# Patient Record
Sex: MALE | Race: WHITE | Employment: STUDENT | URBAN - METROPOLITAN AREA
[De-identification: names, ages, dates, MRNs, and addresses within clinical notes are randomized per-mention and may not be internally consistent; named-entity substitution may affect disease eponyms.]

---
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
## 2020-11-02 ENCOUNTER — PREP FOR PROCEDURE (OUTPATIENT)
Dept: SURGERY | Facility: CLINIC | Age: 20
End: 2020-11-02

## 2020-11-02 ENCOUNTER — OFFICE VISIT (OUTPATIENT)
Dept: SURGERY | Facility: CLINIC | Age: 20
End: 2020-11-02
Payer: COMMERCIAL

## 2020-11-02 VITALS
HEIGHT: 71 IN | WEIGHT: 195 LBS | SYSTOLIC BLOOD PRESSURE: 120 MMHG | BODY MASS INDEX: 27.3 KG/M2 | DIASTOLIC BLOOD PRESSURE: 60 MMHG | HEART RATE: 56 BPM

## 2020-11-02 DIAGNOSIS — L05.91 PILONIDAL CYST: Primary | ICD-10-CM

## 2020-11-02 PROCEDURE — 99213 OFFICE O/P EST LOW 20 MIN: CPT | Performed by: SURGERY

## 2020-11-02 ASSESSMENT — MIFFLIN-ST. JEOR: SCORE: 1916.64

## 2020-11-03 NOTE — PROGRESS NOTES
General surgery clinic note    Since last visit Mr. Colón experienced the time of improvement and no drainage.  Unfortunately the symptoms returned and seem worse at the moment, he is here to discuss surgical treatment..    Exam there are multiple large pits along the midline between his gluteus, debris and hair can be easily extracted from it at the moment.    Worsening pilonidal cyst.  Discussed the rationale, risk, benefits, and hopeful outcomes of excision of this pilonidal cyst with marsupialization.  He understands and would like to proceed.  We will do so as soon as he can find time in his schedule.    Total encounter time 15 minutes, more than half spent in counseling.

## 2020-11-04 ENCOUNTER — TELEPHONE (OUTPATIENT)
Dept: SURGERY | Facility: CLINIC | Age: 20
End: 2020-11-04

## 2020-11-04 NOTE — TELEPHONE ENCOUNTER
Type of surgery: Excision of pilonidal cyst  Location of surgery: [facility]  Date and time of surgery: 1/14/21 at 12:30pm  Surgeon: Dr. Carrillo Madrigal  Pre-Op Appt Date: Patient to schedule  Post-Op Appt Date: Patient to schedule   Packet sent out: Yes  Pre-cert/Authorization completed:  Not Applicable  Date: 11/4/20

## 2020-11-13 DIAGNOSIS — Z11.59 ENCOUNTER FOR SCREENING FOR OTHER VIRAL DISEASES: Primary | ICD-10-CM

## 2021-01-11 DIAGNOSIS — Z11.59 ENCOUNTER FOR SCREENING FOR OTHER VIRAL DISEASES: ICD-10-CM

## 2021-01-11 PROCEDURE — U0003 INFECTIOUS AGENT DETECTION BY NUCLEIC ACID (DNA OR RNA); SEVERE ACUTE RESPIRATORY SYNDROME CORONAVIRUS 2 (SARS-COV-2) (CORONAVIRUS DISEASE [COVID-19]), AMPLIFIED PROBE TECHNIQUE, MAKING USE OF HIGH THROUGHPUT TECHNOLOGIES AS DESCRIBED BY CMS-2020-01-R: HCPCS | Performed by: SURGERY

## 2021-01-11 PROCEDURE — U0005 INFEC AGEN DETEC AMPLI PROBE: HCPCS | Performed by: SURGERY

## 2021-01-12 LAB
SARS-COV-2 RNA RESP QL NAA+PROBE: NOT DETECTED
SPECIMEN SOURCE: NORMAL

## 2021-01-14 ENCOUNTER — APPOINTMENT (OUTPATIENT)
Dept: SURGERY | Facility: PHYSICIAN GROUP | Age: 21
End: 2021-01-14
Payer: COMMERCIAL

## 2021-01-14 ENCOUNTER — SURGERY (OUTPATIENT)
Age: 21
End: 2021-01-14
Payer: COMMERCIAL

## 2021-01-14 ENCOUNTER — ANESTHESIA EVENT (OUTPATIENT)
Dept: SURGERY | Facility: CLINIC | Age: 21
End: 2021-01-14
Payer: COMMERCIAL

## 2021-01-14 ENCOUNTER — ANESTHESIA (OUTPATIENT)
Dept: SURGERY | Facility: CLINIC | Age: 21
End: 2021-01-14
Payer: COMMERCIAL

## 2021-01-14 ENCOUNTER — HOSPITAL ENCOUNTER (OUTPATIENT)
Facility: CLINIC | Age: 21
Discharge: HOME | End: 2021-01-14
Attending: SURGERY | Admitting: SURGERY
Payer: COMMERCIAL

## 2021-01-14 VITALS
OXYGEN SATURATION: 99 % | WEIGHT: 205 LBS | RESPIRATION RATE: 16 BRPM | HEIGHT: 71 IN | HEART RATE: 55 BPM | SYSTOLIC BLOOD PRESSURE: 110 MMHG | TEMPERATURE: 97 F | BODY MASS INDEX: 28.7 KG/M2 | DIASTOLIC BLOOD PRESSURE: 65 MMHG

## 2021-01-14 DIAGNOSIS — L05.91 PILONIDAL CYST: ICD-10-CM

## 2021-01-14 DIAGNOSIS — G89.18 POSTOPERATIVE PAIN: Primary | ICD-10-CM

## 2021-01-14 PROCEDURE — 88304 TISSUE EXAM BY PATHOLOGIST: CPT | Mod: TC | Performed by: SURGERY

## 2021-01-14 PROCEDURE — 11771 EXC PILONIDAL CYST XTNSV: CPT | Performed by: SURGERY

## 2021-01-14 PROCEDURE — 272N000001 HC OR GENERAL SUPPLY STERILE: Performed by: SURGERY

## 2021-01-14 PROCEDURE — 370N000017 HC ANESTHESIA TECHNICAL FEE, PER MIN: Performed by: SURGERY

## 2021-01-14 PROCEDURE — 360N000075 HC SURGERY LEVEL 2, PER MIN: Performed by: SURGERY

## 2021-01-14 PROCEDURE — 258N000003 HC RX IP 258 OP 636

## 2021-01-14 PROCEDURE — 11772 EXC PILONIDAL CYST COMP: CPT | Performed by: SURGERY

## 2021-01-14 PROCEDURE — 710N000012 HC RECOVERY PHASE 2, PER MINUTE: Performed by: SURGERY

## 2021-01-14 PROCEDURE — 250N000011 HC RX IP 250 OP 636: Performed by: SURGERY

## 2021-01-14 PROCEDURE — 250N000011 HC RX IP 250 OP 636

## 2021-01-14 PROCEDURE — 250N000009 HC RX 250

## 2021-01-14 PROCEDURE — 710N000009 HC RECOVERY PHASE 1, LEVEL 1, PER MIN: Performed by: SURGERY

## 2021-01-14 PROCEDURE — 250N000011 HC RX IP 250 OP 636: Performed by: NURSE ANESTHETIST, CERTIFIED REGISTERED

## 2021-01-14 PROCEDURE — 999N000141 HC STATISTIC PRE-PROCEDURE NURSING ASSESSMENT: Performed by: SURGERY

## 2021-01-14 PROCEDURE — 250N000009 HC RX 250: Performed by: SURGERY

## 2021-01-14 PROCEDURE — 88304 TISSUE EXAM BY PATHOLOGIST: CPT | Mod: 26 | Performed by: PATHOLOGY

## 2021-01-14 RX ORDER — CEFAZOLIN SODIUM 2 G/100ML
2 INJECTION, SOLUTION INTRAVENOUS
Status: COMPLETED | OUTPATIENT
Start: 2021-01-14 | End: 2021-01-14

## 2021-01-14 RX ORDER — MAGNESIUM HYDROXIDE 1200 MG/15ML
LIQUID ORAL PRN
Status: DISCONTINUED | OUTPATIENT
Start: 2021-01-14 | End: 2021-01-14 | Stop reason: HOSPADM

## 2021-01-14 RX ORDER — FENTANYL CITRATE 50 UG/ML
INJECTION, SOLUTION INTRAMUSCULAR; INTRAVENOUS PRN
Status: DISCONTINUED | OUTPATIENT
Start: 2021-01-14 | End: 2021-01-14

## 2021-01-14 RX ORDER — SODIUM CHLORIDE, SODIUM LACTATE, POTASSIUM CHLORIDE, CALCIUM CHLORIDE 600; 310; 30; 20 MG/100ML; MG/100ML; MG/100ML; MG/100ML
INJECTION, SOLUTION INTRAVENOUS CONTINUOUS
Status: DISCONTINUED | OUTPATIENT
Start: 2021-01-14 | End: 2021-01-14 | Stop reason: HOSPADM

## 2021-01-14 RX ORDER — ONDANSETRON 2 MG/ML
INJECTION INTRAMUSCULAR; INTRAVENOUS PRN
Status: DISCONTINUED | OUTPATIENT
Start: 2021-01-14 | End: 2021-01-14

## 2021-01-14 RX ORDER — ONDANSETRON 4 MG/1
4 TABLET, ORALLY DISINTEGRATING ORAL EVERY 30 MIN PRN
Status: DISCONTINUED | OUTPATIENT
Start: 2021-01-14 | End: 2021-01-14 | Stop reason: HOSPADM

## 2021-01-14 RX ORDER — NALOXONE HYDROCHLORIDE 0.4 MG/ML
0.2 INJECTION, SOLUTION INTRAMUSCULAR; INTRAVENOUS; SUBCUTANEOUS
Status: CANCELLED | OUTPATIENT
Start: 2021-01-14 | End: 2021-01-15

## 2021-01-14 RX ORDER — OXYCODONE HYDROCHLORIDE 5 MG/1
5 TABLET ORAL
Status: DISCONTINUED | OUTPATIENT
Start: 2021-01-14 | End: 2021-01-14 | Stop reason: HOSPADM

## 2021-01-14 RX ORDER — CEFAZOLIN SODIUM 1 G/3ML
1 INJECTION, POWDER, FOR SOLUTION INTRAMUSCULAR; INTRAVENOUS SEE ADMIN INSTRUCTIONS
Status: DISCONTINUED | OUTPATIENT
Start: 2021-01-14 | End: 2021-01-14 | Stop reason: HOSPADM

## 2021-01-14 RX ORDER — KETOROLAC TROMETHAMINE 30 MG/ML
INJECTION, SOLUTION INTRAMUSCULAR; INTRAVENOUS PRN
Status: DISCONTINUED | OUTPATIENT
Start: 2021-01-14 | End: 2021-01-14

## 2021-01-14 RX ORDER — ONDANSETRON 2 MG/ML
4 INJECTION INTRAMUSCULAR; INTRAVENOUS EVERY 30 MIN PRN
Status: DISCONTINUED | OUTPATIENT
Start: 2021-01-14 | End: 2021-01-14 | Stop reason: HOSPADM

## 2021-01-14 RX ORDER — AMOXICILLIN 250 MG
1-2 CAPSULE ORAL 2 TIMES DAILY
Qty: 30 TABLET | Refills: 0 | Status: SHIPPED | OUTPATIENT
Start: 2021-01-14 | End: 2021-01-20

## 2021-01-14 RX ORDER — ACETAMINOPHEN 325 MG/1
650 TABLET ORAL
Status: DISCONTINUED | OUTPATIENT
Start: 2021-01-14 | End: 2021-01-14 | Stop reason: HOSPADM

## 2021-01-14 RX ORDER — NALOXONE HYDROCHLORIDE 0.4 MG/ML
0.4 INJECTION, SOLUTION INTRAMUSCULAR; INTRAVENOUS; SUBCUTANEOUS
Status: CANCELLED | OUTPATIENT
Start: 2021-01-14 | End: 2021-01-15

## 2021-01-14 RX ORDER — DEXAMETHASONE SODIUM PHOSPHATE 4 MG/ML
INJECTION, SOLUTION INTRA-ARTICULAR; INTRALESIONAL; INTRAMUSCULAR; INTRAVENOUS; SOFT TISSUE PRN
Status: DISCONTINUED | OUTPATIENT
Start: 2021-01-14 | End: 2021-01-14

## 2021-01-14 RX ORDER — PROPOFOL 10 MG/ML
INJECTION, EMULSION INTRAVENOUS CONTINUOUS PRN
Status: DISCONTINUED | OUTPATIENT
Start: 2021-01-14 | End: 2021-01-14

## 2021-01-14 RX ORDER — PROPOFOL 10 MG/ML
INJECTION, EMULSION INTRAVENOUS PRN
Status: DISCONTINUED | OUTPATIENT
Start: 2021-01-14 | End: 2021-01-14

## 2021-01-14 RX ORDER — LABETALOL HYDROCHLORIDE 5 MG/ML
10 INJECTION, SOLUTION INTRAVENOUS
Status: DISCONTINUED | OUTPATIENT
Start: 2021-01-14 | End: 2021-01-14 | Stop reason: HOSPADM

## 2021-01-14 RX ORDER — SODIUM CHLORIDE, SODIUM LACTATE, POTASSIUM CHLORIDE, CALCIUM CHLORIDE 600; 310; 30; 20 MG/100ML; MG/100ML; MG/100ML; MG/100ML
INJECTION, SOLUTION INTRAVENOUS CONTINUOUS PRN
Status: DISCONTINUED | OUTPATIENT
Start: 2021-01-14 | End: 2021-01-14

## 2021-01-14 RX ORDER — HYDROMORPHONE HYDROCHLORIDE 1 MG/ML
.3-.5 INJECTION, SOLUTION INTRAMUSCULAR; INTRAVENOUS; SUBCUTANEOUS EVERY 5 MIN PRN
Status: DISCONTINUED | OUTPATIENT
Start: 2021-01-14 | End: 2021-01-14 | Stop reason: HOSPADM

## 2021-01-14 RX ORDER — FENTANYL CITRATE 0.05 MG/ML
25-50 INJECTION, SOLUTION INTRAMUSCULAR; INTRAVENOUS
Status: DISCONTINUED | OUTPATIENT
Start: 2021-01-14 | End: 2021-01-14 | Stop reason: HOSPADM

## 2021-01-14 RX ORDER — OXYCODONE HYDROCHLORIDE 5 MG/1
5 TABLET ORAL EVERY 4 HOURS PRN
Qty: 12 TABLET | Refills: 0 | Status: SHIPPED | OUTPATIENT
Start: 2021-01-14 | End: 2021-01-20

## 2021-01-14 RX ORDER — LIDOCAINE HYDROCHLORIDE 20 MG/ML
INJECTION, SOLUTION INFILTRATION; PERINEURAL PRN
Status: DISCONTINUED | OUTPATIENT
Start: 2021-01-14 | End: 2021-01-14

## 2021-01-14 RX ORDER — BUPIVACAINE HYDROCHLORIDE AND EPINEPHRINE 5; 5 MG/ML; UG/ML
INJECTION, SOLUTION PERINEURAL PRN
Status: DISCONTINUED | OUTPATIENT
Start: 2021-01-14 | End: 2021-01-14 | Stop reason: HOSPADM

## 2021-01-14 RX ORDER — BUPIVACAINE HYDROCHLORIDE AND EPINEPHRINE 5; 5 MG/ML; UG/ML
INJECTION, SOLUTION EPIDURAL; INTRACAUDAL; PERINEURAL
Status: DISCONTINUED
Start: 2021-01-14 | End: 2021-01-14 | Stop reason: HOSPADM

## 2021-01-14 RX ADMIN — FENTANYL CITRATE 50 MCG: 50 INJECTION, SOLUTION INTRAMUSCULAR; INTRAVENOUS at 12:20

## 2021-01-14 RX ADMIN — DEXMEDETOMIDINE HYDROCHLORIDE 12 MCG: 100 INJECTION, SOLUTION INTRAVENOUS at 12:22

## 2021-01-14 RX ADMIN — PROPOFOL 150 MCG/KG/MIN: 10 INJECTION, EMULSION INTRAVENOUS at 11:58

## 2021-01-14 RX ADMIN — MIDAZOLAM 2 MG: 1 INJECTION INTRAMUSCULAR; INTRAVENOUS at 11:54

## 2021-01-14 RX ADMIN — SODIUM CHLORIDE, POTASSIUM CHLORIDE, SODIUM LACTATE AND CALCIUM CHLORIDE: 600; 310; 30; 20 INJECTION, SOLUTION INTRAVENOUS at 11:54

## 2021-01-14 RX ADMIN — PROPOFOL 30 MG: 10 INJECTION, EMULSION INTRAVENOUS at 11:59

## 2021-01-14 RX ADMIN — SODIUM CHLORIDE 1000 ML: 900 IRRIGANT IRRIGATION at 12:19

## 2021-01-14 RX ADMIN — ONDANSETRON 4 MG: 2 INJECTION INTRAMUSCULAR; INTRAVENOUS at 12:25

## 2021-01-14 RX ADMIN — KETOROLAC TROMETHAMINE 30 MG: 30 INJECTION, SOLUTION INTRAMUSCULAR at 12:43

## 2021-01-14 RX ADMIN — CEFAZOLIN SODIUM 2 G: 2 INJECTION, SOLUTION INTRAVENOUS at 12:07

## 2021-01-14 RX ADMIN — DEXMEDETOMIDINE HYDROCHLORIDE 4 MCG: 100 INJECTION, SOLUTION INTRAVENOUS at 12:58

## 2021-01-14 RX ADMIN — DEXAMETHASONE SODIUM PHOSPHATE 4 MG: 4 INJECTION, SOLUTION INTRA-ARTICULAR; INTRALESIONAL; INTRAMUSCULAR; INTRAVENOUS; SOFT TISSUE at 12:25

## 2021-01-14 RX ADMIN — FENTANYL CITRATE 50 MCG: 50 INJECTION, SOLUTION INTRAMUSCULAR; INTRAVENOUS at 12:00

## 2021-01-14 RX ADMIN — LIDOCAINE HYDROCHLORIDE 80 MG: 20 INJECTION, SOLUTION INFILTRATION; PERINEURAL at 11:59

## 2021-01-14 RX ADMIN — BUPIVACAINE HYDROCHLORIDE AND EPINEPHRINE BITARTRATE 30 ML: 5; .005 INJECTION, SOLUTION PERINEURAL at 12:52

## 2021-01-14 RX ADMIN — PROPOFOL 30 MG: 10 INJECTION, EMULSION INTRAVENOUS at 12:21

## 2021-01-14 ASSESSMENT — MIFFLIN-ST. JEOR: SCORE: 1962

## 2021-01-14 ASSESSMENT — LIFESTYLE VARIABLES: TOBACCO_USE: 0

## 2021-01-14 NOTE — ANESTHESIA PREPROCEDURE EVALUATION
"Anesthesia Pre-Procedure Evaluation    Patient: Dimitris Colón   MRN: [id number] : 2000          Preoperative Diagnosis: Pilonidal cyst [L05.91]    Procedure(s):  EXCISION PILONIDAL CYST    History reviewed. No pertinent past medical history.  Past Surgical History:   Procedure Laterality Date     ENT SURGERY         Anesthesia Evaluation     .             ROS/MED HX    ENT/Pulmonary:      (-) tobacco use and sleep apnea   Neurologic:       Cardiovascular:         METS/Exercise Tolerance:     Hematologic:         Musculoskeletal:         GI/Hepatic:        (-) GERD   Renal/Genitourinary:         Endo:         Psychiatric:         Infectious Disease:         Malignancy:         Other:                          Physical Exam  Normal systems: cardiovascular, pulmonary and dental    Airway   Mallampati: I  TM distance: >3 FB  Neck ROM: full    Dental     Cardiovascular       Pulmonary             Lab Results   Component Value Date    WBC 7.0 2017    HGB 16.4 (H) 2017    HCT 47.3 (H) 2017     2017     2017    POTASSIUM 4.1 2017    CHLORIDE 104 2017    CO2 29 2017    BUN 12 2017    CR 0.91 2017    GLC 79 2017    ANUJA 9.4 2017       Preop Vitals  BP Readings from Last 3 Encounters:   21 127/62   20 120/60   08/15/19 106/62    Pulse Readings from Last 3 Encounters:   21 55   20 56   08/15/19 66      Resp Readings from Last 3 Encounters:   21 16   19 16   17 18    SpO2 Readings from Last 3 Encounters:   21 98%   08/15/19 96%   19 98%      Temp Readings from Last 1 Encounters:   21 36.6  C (97.8  F)    Ht Readings from Last 1 Encounters:   21 1.803 m (5' 11\")      Wt Readings from Last 1 Encounters:   21 93 kg (205 lb)    Estimated body mass index is 28.59 kg/m  as calculated from the following:    Height as of this encounter: 1.803 m (5' 11\").    Weight as of this " encounter: 93 kg (205 lb).       Anesthesia Plan      History & Physical Review  History and physical reviewed and following examination; no interval change.    ASA Status:  1 .    NPO Status:  > 8 hours    Plan for MAC   PONV prophylaxis:  Ondansetron (or other 5HT-3)         Postoperative Care  Postoperative pain management:  IV analgesics and Oral pain medications.      Consents  Anesthetic plan, risks, benefits and alternatives discussed with:  Patient..                 Tad Yoon MD

## 2021-01-14 NOTE — ANESTHESIA POSTPROCEDURE EVALUATION
Patient: Dimitris Colón    Procedure(s):  EXCISION PILONIDAL CYST    Diagnosis:Pilonidal cyst [L05.91]  Diagnosis Additional Information: No value filed.    Anesthesia Type:  MAC    Note:  Anesthesia Post Evaluation    Patient location during evaluation: PACU  Patient participation: Able to fully participate in evaluation  Level of consciousness: awake and alert  Pain management: adequate  Airway patency: patent  Cardiovascular status: acceptable  Respiratory status: acceptable  Hydration status: acceptable  PONV: none     Anesthetic complications: None          Last vitals:  Vitals:    01/14/21 1345 01/14/21 1400 01/14/21 1415   BP: 99/51 (!) 85/64 115/71   Pulse: (!) 49 63 59   Resp: 15 9 14   Temp:      SpO2: 97% 100% 98%         Electronically Signed By: Tad Yoon MD  January 14, 2021  4:42 PM

## 2021-01-14 NOTE — OP NOTE
Preoperative diagnosis: None healing pilonidal cyst  Postoperative diagnosis: Same  Operative procedure: Excision of pilonidal cyst and tunnels with marsupialization  Surgeon:  Carrillo Madrigal M.D.  Assistant: Ariana Mariee PA-C, The physicians assistant was medically necessary for their expertise in hemostasis, suturing, and retraction.    Anesthesia: MAC plus Local  EBL: Minimal  Events:  The procedure was started with Dimitris Colón in the prone position, his lower back and buttocks were prepped and draped in the usual sterile fashion.  A generous amount of short and long-acting local anesthetic was utilized in the area of concern.  Incision was made sharply, electrocautery and blunt dissection down to and around the cyst and associated tunnels, all of which was fully excised.  The cavity was explored and no evidence of additional pathology noted.  Hemostasis was secured.  The wound was marsupialized with 3-0 Vicryl and 2-0 chromic gut.  Sterile dressing applied.  All counts correct.  No immediate complications.

## 2021-01-14 NOTE — BRIEF OP NOTE
[facility]    Brief Operative Note    Pre-operative diagnosis: Pilonidal cyst [L05.91]  Post-operative diagnosis Same as pre-operative diagnosis    Procedure: Procedure(s):  EXCISION PILONIDAL CYST  Surgeon: Surgeon(s) and Role:     * Carrillo Madrigal MD - Primary     * Ariana Mariee PA-C - Assisting  Anesthesia: Monitor Anesthesia Care   Estimated blood loss: 5 ml  Drains: None  Specimens:   ID Type Source Tests Collected by Time Destination   A : PILONIDAL CYST Tissue Other SURGICAL PATHOLOGY EXAM Carrillo Madrigal MD 1/14/2021 12:27 PM      Findings:   Pilonidal disease, excised and wound marsupialized with chromic suture. Wound with lidocaine moistened gauze for dressing.  Complications: None.  Implants: * No implants in log *

## 2021-01-14 NOTE — DISCHARGE INSTRUCTIONS
[facility]  Discharge Instructions: Post-Operative Excision of Pilonidal Cyst    ACTIVITY    Take frequent, short walks and increase your activity gradually.      Avoid strenuous physical activity or heavy bending/lifting for 2 weeks.    You may drive without restrictions when you are not using any prescription pain medication and feel comfortable in a car.    You may return to work/school when you are comfortable without any prescription pain medication.    WOUND CARE    You may remove your outer dressings and shower 24 hours after the surgery. Pat your incision dry.    Apply saline moistened gauze to wound and change this 2-3 times a day. Cover wound with additional gauze and tape to secure.    May do sitz baths for comfort.    Do not soak your wound in a tub or pool.    Do not apply any other lotions, creams, or ointments to your wound.    DIET    Start with liquids, then gradually resume your regular diet as tolerated.     Drink plenty of liquids to stay hydrated.    PAIN    Expect some tenderness and discomfort at the incision site.  Use the prescribed pain medication at your discretion.  Expect gradual resolution of your pain over several days.    You may take ibuprofen with food (unless you have been told not to) or acetaminophen/Tylenol instead of or in addition to your prescribed pain medication.     You may take Tylenol 500 - 1000 mg every 6 hours as needed for pain - do not exceed 4,000 mg of tylenol (acetaminophen) from all sources in 24 hours.    You may take Ibuprofen 400 - 600 mg every 6 hours as needed for pain - do not exceed 2,400 mg of ibuprofen from all sources in 24 hours. Do not use Ibuprofen for any longer than necessary or take if you have been told not to by another medical provider.    You may alternate Ibuprofen and Tylenol every 3 hours as needed for pain. Reserve the narcotic prescription for refractory pain.    Do not drink alcohol or drive while you are  taking pain medications.    You may apply ice to the surgical area in 20 minute intervals as needed. Do not apply ice directly to your skin to avoid damage to your skin or the incision.    EXPECTATIONS    Pain medications can cause constipation.  Limit use when possible.  Take over the counter stool softener/stimulant, such as Colace or Senna, 1-2 times a day with plenty of water.  You may take a mild over the counter laxative, such as Miralax or a suppository, as needed.      You may discontinue these medications once you are having regular bowel movements and/or are no longer taking your narcotic pain medication.    FOLLOW UP    Follow up with Dr. Madrigal in 1 week for a wound check.  Call [phone] to schedule your appointments.  We are located at [address] [place] [address].    CALL OUR OFFICE -[phone] IF YOU HAVE:     Chills or fever above 101 F.    Increased redness, warmth, or drainage at your incisions.    Significant bleeding.    Pain not relieved by your pain medication or rest.    Increasing pain after the first 48 hours.    Any other concerns or questions.               Same Day Surgery Discharge Instructions for  Sedation and General Anesthesia       It's not unusual to feel dizzy, light-headed or faint for up to 24 hours after surgery or while taking pain medication.  If you have these symptoms: sit for a few minutes before standing and have someone assist you when you get up to walk or use the bathroom.      You should rest and relax for the next 24 hours. We recommend you make arrangements to have an adult stay with you for at least 24 hours after your discharge.  Avoid hazardous and strenuous activity.      DO NOT DRIVE any vehicle or operate mechanical equipment for 24 hours following the end of your surgery.  Even though you may feel normal, your reactions may be affected by the medication you have received.      Do not drink alcoholic beverages for 24 hours  following surgery.       Slowly progress to your regular diet as you feel able. It's not unusual to feel nauseated and/or vomit after receiving anesthesia.  If you develop these symptoms, drink clear liquids (apple juice, ginger ale, broth, 7-up, etc. ) until you feel better.  If your nausea and vomiting persists for 24 hours, please notify your surgeon.        All narcotic pain medications, along with inactivity and anesthesia, can cause constipation. Drinking plenty of liquids and increasing fiber intake will help.      For any questions of a medical nature, call your surgeon.      Do not make important decisions for 24 hours.      If you had general anesthesia, you may have a sore throat for a couple of days related to the breathing tube used during surgery.  You may use Cepacol lozenges to help with this discomfort.  If it worsens or if you develop a fever, contact your surgeon.       If you feel your pain is not well managed with the pain medications prescribed by your surgeon, please contact your surgeon's office to let them know so they can address your concerns.       CoVid 19 Information    We want to give you information regarding Covid. Please consult your primary care provider with any questions you might have.     Patient who have symptoms (cough, fever, or shortness of breath), need to isolate for 7 days from when symptoms started OR 72 hours after fever resolves (without fever reducing medications) AND improvement of respiratory symptoms (whichever is longer).      Isolate yourself at home (in own room/own bathroom if possible)    Do Not allow any visitors    Do Not go to work or school    Do Not go to [facility],  centers, shopping, or other public places.    Do Not shake hands.    Avoid close and intimate contact with others (hugging, kissing).    Follow CDC recommendations for household cleaning of frequently touched services.     After the initial 7 days, continue to isolate yourself from  household members as much as possible. To continue decrease the risk of community spread and exposure, you and any members of your household should limit activities in public for 14 days after starting home isolation.     You can reference the following CDC link for helpful home isolation/care tips:  https://www.cdc.gov/coronavirus/2019-ncov/downloads/10Things.pdf    Protect Others:    Cover Your Mouth and Nose with a mask, disposable tissue or wash cloth to avoid spreading germs to others.    Wash your hands and face frequently with soap and water    Call Your Primary Doctor If: Breathing difficulty develops or you become worse.    For more information about COVID19 and options for caring for yourself at home, please visit the CDC website at https://www.cdc.gov/coronavirus/2019-ncov/about/steps-when-sick.html  For more options for care at [facility], please visit our website at [email or web address]          **If you have concerns or questions about your procedure,    please contact Dr. Madrigal at  [phone]**

## 2021-01-14 NOTE — ANESTHESIA CARE TRANSFER NOTE
Patient: Dimitris Colón    Procedure(s):  EXCISION PILONIDAL CYST    Diagnosis: Pilonidal cyst [L05.91]  Diagnosis Additional Information: No value filed.    Anesthesia Type:   MAC     Note:  Airway :Room Air  Patient transferred to:PACU  Comments: At end of procedure, spontaneous respirations, patient alert to voice, able to follow commands. Patient breathing room air PACU. SpO2, NiBP, and EKG monitors and alarms on and functioning, report on patient's clinical status given to PACU RN, RN questions answered.Handoff Report: Identifed the Patient, Identified the Reponsible Provider, Reviewed the pertinent medical history, Discussed the surgical course, Reviewed Intra-OP anesthesia mangement and issues during anesthesia, Set expectations for post-procedure period and Allowed opportunity for questions and acknowledgement of understanding      Vitals: (Last set prior to Anesthesia Care Transfer)    CRNA VITALS  1/14/2021 1243 - 1/14/2021 1320      1/14/2021             Resp Rate (set):  10                Electronically Signed By: WILL Baca CRNA  January 14, 2021  1:20 PM

## 2021-01-15 LAB — COPATH REPORT: NORMAL

## 2021-01-15 NOTE — PLAN OF CARE
Pt A&O, AVSS. Pt tolerating PO, denies nausea. Denies pain. Able to void. Discharge instructions completed via phone with pts dad per COVID guidelines. Completed instructions regarding would care and sent with supplies. Pt demonstrates understanding. Sent pt home with all meds, supplies and belongings including cell phone.

## 2021-01-19 ENCOUNTER — TELEPHONE (OUTPATIENT)
Dept: OTHER | Facility: CLINIC | Age: 21
End: 2021-01-19

## 2021-01-19 NOTE — TELEPHONE ENCOUNTER
Procedure:  Excision of pilonidal cyst and tunnels with marsupialization    Date:  01/14/2021    Surgeon:  Rohan      Patient reports draining from surgical area. This began a few days ago.  He is reporting that it is uellowish, clear, and blood tinged.  Thick consistency.    He reports it feels similar to the symptoms he would get prior to surgery when he would need to be placed on antibiotics.    He is still doing the daily dressing changes. Reports that the drainage is saturated through both dressings by morning.    He has a follow up scheduled with Dr. Madrigal tomorrow.  Will talk to Dr. Madrigal to see if he would like abx started or if okay to wait until follow up tomorrow for evaluation.    Will call patient back.  Patient in agreement.    Bernie Gonzalez RN-BSN

## 2021-01-19 NOTE — TELEPHONE ENCOUNTER
1/14/21 pilonidal cyst removal LEL, is having drainage from incision. Wondering about infection    Phone: [phone]    Message ok

## 2021-01-19 NOTE — TELEPHONE ENCOUNTER
Per Dr. Madrigal -    Patient should increase dressing changes. Pt currently changing twice a day. Recommend he start changing three times daily.  He should irrigate wound in shower daily and be sure to pack far enough into wound.    Patient is in agreement. He will discuss further with Dr. Madrigal tomorrow during clinic visit.    Bernie Gonzalez RN-BSN

## 2021-01-20 ENCOUNTER — OFFICE VISIT (OUTPATIENT)
Dept: SURGERY | Facility: CLINIC | Age: 21
End: 2021-01-20
Payer: COMMERCIAL

## 2021-01-20 DIAGNOSIS — Z09 SURGERY FOLLOW-UP EXAMINATION: Primary | ICD-10-CM

## 2021-01-20 PROCEDURE — 99024 POSTOP FOLLOW-UP VISIT: CPT | Performed by: SURGERY

## 2021-01-21 ENCOUNTER — TELEPHONE (OUTPATIENT)
Dept: SURGERY | Facility: CLINIC | Age: 21
End: 2021-01-21

## 2021-01-21 NOTE — PROGRESS NOTES
General surgery clinic note  First postoperative visit for Mr. Colón after excision of pilonidal cyst and marsupialization.  He has been doing 3-4 dressing changes daily, the content of the gauze seems to be reddish and brownish.  On exam the skin surrounding the wound is healthy-appearing, he has been doing a good job of packing the wound in the upper aspect but the more distal aspects are somewhat filled with debris likely due to poor gauze placement.  In clinic wound was cleansed and packed.  We spent some time talking about the importance and different ways to try to pack the wound properly.  We talked about either using sitz bath to soften up the scar tissue, showerhead debriding, proper gauze application throughout the entire length of the wound.  He feels comfortable utilizing some of this techniques as he is returning to school within the next week.  I encouraged him to call us to keep us up-to-date and or to discuss any changes or concerns regarding this wound.    Please call us if you have questions.

## 2021-01-21 NOTE — TELEPHONE ENCOUNTER
Name of caller: mother    Reason for Call:  Symptoms  Patient is still in a lot of pain. He saw Dr. Madrigal yesterday, but mom does not know what was discussed in the appointment. Would like to speak with a nurse.    Surgeon:  Dr. Madrigal    Recent Surgery:  Yes.    If yes, when & what type:  1/14/21    EXCISION PILONIDAL CYST      Best phone number to reach pt at is: [phone]-mom's phone-  Consent to communicate on file.    Ok to leave a message with medical info? Yes.

## 2021-01-21 NOTE — TELEPHONE ENCOUNTER
Procedure:  Excision of pilonidal cyst and tunnels with marsupialization    Date:  01/14/2021    Surgeon:  Rohan    Patient's mother wanting to discuss patient's symptoms and expectations.    She reports that he is having increasing pain, wondering how long this will last and what can be done.  He had an appointment with Dr. Madrigal yesterday and was supposed to call her so she could listen in on the visit, but this did not happen.    Informed her that it could be more sore today because of the visit yesterday. Unsure if there was any debriding needed, cleaning of wound, etc.    Also informed her it could be because of the packing.  Normal to have is comfort while healing.    Reiterated importance of packing both upper and distal aspects to help keep debris/scar tissue out. Also reiterated importance of cleansing with sitz baths to soften the scar tissue, or using showerhead for debriding to help with healing.    Will ask Dr. Madrigal to call her to discuss in greater detail.    Bernie Gonzalez RN-BSN

## 2021-05-27 ENCOUNTER — TELEPHONE (OUTPATIENT)
Dept: SURGERY | Facility: CLINIC | Age: 21
End: 2021-05-27

## 2021-05-27 NOTE — TELEPHONE ENCOUNTER
"Procedure:  Excision of pilonidal cyst and tunnels with marsupialization    Date:  01/14/2021    Surgeon:  Rohan    Patient reporting ongoing draining from surgical area.  Two parts of incision still not healed.  He reports drainage is primarily \"blood,\" but occasionally \"pus\" will come out.    Some increasing pain associated with it, described as \"occasional sharp pain.\"    No fever. No other s/s of infection.    Will discuss with Dr. Madrigal for appropriate course of action and call patient back with recommendations.    He is in agreement with the plan and will wait for a call back.    Bernie Gonzalez RN-BSN    "

## 2021-05-28 ENCOUNTER — OFFICE VISIT (OUTPATIENT)
Dept: SURGERY | Facility: CLINIC | Age: 21
End: 2021-05-28
Payer: COMMERCIAL

## 2021-05-28 VITALS
DIASTOLIC BLOOD PRESSURE: 62 MMHG | SYSTOLIC BLOOD PRESSURE: 110 MMHG | HEIGHT: 71 IN | OXYGEN SATURATION: 99 % | BODY MASS INDEX: 28 KG/M2 | HEART RATE: 56 BPM | WEIGHT: 200 LBS

## 2021-05-28 DIAGNOSIS — L98.8 PILONIDAL DISEASE: Primary | ICD-10-CM

## 2021-05-28 PROCEDURE — 17250 CHEM CAUT OF GRANLTJ TISSUE: CPT | Performed by: SURGERY

## 2021-05-28 ASSESSMENT — MIFFLIN-ST. JEOR: SCORE: 1939.32

## 2021-05-28 NOTE — PROGRESS NOTES
"[facility] General Surgery Clinic     CHIEF COMPLAINT:  Chief Complaint   Patient presents with     RECHECK     Pilonidal cyst, draining and tunneling       HISTORY OF PRESENT ILLNESS:  Dimitris Colón is a 20 year old male who is seen for postoperative wound follow-up.  Patient underwent excision of pilonidal cyst and tunnels with marsupialization in January 2021 with Dr. Madrigal.  He did well postoperatively.  Patient reports that there were 2 small areas of his midline incision that never fully closed.  He has been working a lot lately doing construction.  With the physicality of his work, the patient reports he has noticed increasing pain and drainage at his gluteal cleft surgical site.  He describes drainage of blood and intermittently some purulent drainage.  He also has intermittent sharp pain.  Patient has not been having any fevers or chills.  No other systemic symptoms.  The patient was seen at the urgent care this morning and subsequently referred to our clinic for wound check.      No past medical history on file.    Past Surgical History:   Procedure Laterality Date     CYSTECTOMY PILONIDAL N/A 1/14/2021    Procedure: EXCISION PILONIDAL CYST;  Surgeon: Carrillo Madrigal MD;  Location:  OR     ENT SURGERY         Family History   Problem Relation Age of Onset     Breast Cancer Maternal Grandmother      Bone Cancer Maternal Grandmother      Prostate Cancer Maternal Grandfather      Diabetes Paternal Grandmother      Breast Cancer Paternal Grandmother        Social History     Tobacco Use     Smoking status: Never Smoker     Smokeless tobacco: Never Used   Substance Use Topics     Alcohol use: Never     Frequency: Never       Patient Active Problem List   Diagnosis     Pilonidal cyst       No Known Allergies    No current outpatient medications on file.       Vitals: /62 (BP Location: Right arm, Patient Position: Sitting, Cuff Size: Adult Regular)   Pulse 56   Ht 1.803 m (5' 11\")   Wt 90.7 kg (200 lb)  "  SpO2 99%   BMI 27.89 kg/m    BMI= Body mass index is 27.89 kg/m .    EXAM:  General: Vital signs reviewed, in no apparent distress  Eyes: Anicteric  HENT: Normocephalic, atraumatic, trachea midline   Respiratory: Breathing nonlabored  Cardiovascular: Regular rate and rhythm  Musculoskeletal: No gross deformities  Neurologic: Grossly nonfocal exam  Psychiatric: Normal mood, affect and insight  Integumentary: Well-healed wound of gluteal cleft with 2 small open areas containing mitali of hair, hair removed from open areas and granulation tissue treated with silver nitrate, periwound superior gluteal cleft area shaved      ASSESSMENT:  Dimitris Colón is a 20 year old who presents for wound follow-up, status post excision of pilonidal cyst and tunnels      PLAN:  The patient's gluteal cleft wound actually looks to be healing well without evidence of infection or undrained fluid collection.  The 2 small open areas along the gluteal cleft incision site were debrided of care and treated with silver nitrate.  The area surrounding the patient surgical site was shaved today in clinic.  The patient was instructed to monitor his symptoms over the next 1 to 2 weeks.  If he has not noticed improvement, he was instructed to contact our clinic for repeat evaluation.    It was my pleasure to participate in the care of Dimitris Colón in clinic today.         Henna Swift MD

## 2021-05-28 NOTE — LETTER
June 1, 2021          Elena Tejal Loki, APRN CNP  [address] [place] [address]      RE:   Dimitris Colón 2000      Dear Colleague,    Thank you for referring your patient, Dimitris Colón, to [facility] at [facility]. Please see a copy of my visit note below.    [facility]      CHIEF COMPLAINT:       Chief Complaint   Patient presents with     RECHECK       Pilonidal cyst, draining and tunneling         HISTORY OF PRESENT ILLNESS:  Dimitris Colón is a 20 year old male who is seen for postoperative wound follow-up.  Patient underwent excision of pilonidal cyst and tunnels with marsupialization in January 2021 with Dr. Madrigal.  He did well postoperatively.  Patient reports that there were 2 small areas of his midline incision that never fully closed.  He has been working a lot lately doing construction.  With the physicality of his work, the patient reports he has noticed increasing pain and drainage at his gluteal cleft surgical site.  He describes drainage of blood and intermittently some purulent drainage.  He also has intermittent sharp pain.  Patient has not been having any fevers or chills.  No other systemic symptoms.  The patient was seen at the urgent care this morning and subsequently referred to our clinic for wound check.        No past medical history on file.           Past Surgical History:   Procedure Laterality Date     CYSTECTOMY PILONIDAL N/A 1/14/2021     Procedure: EXCISION PILONIDAL CYST;  Surgeon: Carrillo Madrigal MD;  Location:  OR     ENT SURGERY                   Family History   Problem Relation Age of Onset     Breast Cancer Maternal Grandmother       Bone Cancer Maternal Grandmother       Prostate Cancer Maternal Grandfather       Diabetes Paternal Grandmother       Breast Cancer Paternal Grandmother           Social History            Tobacco Use     Smoking status: Never Smoker     Smokeless tobacco: Never Used   Substance Use  "Topics     Alcohol use: Never       Frequency: Never             Patient Active Problem List   Diagnosis     Pilonidal cyst         No Known Allergies     No current outpatient medications on file.         Vitals: /62 (BP Location: Right arm, Patient Position: Sitting, Cuff Size: Adult Regular)   Pulse 56   Ht 1.803 m (5' 11\")   Wt 90.7 kg (200 lb)   SpO2 99%   BMI 27.89 kg/m    BMI= Body mass index is 27.89 kg/m .     EXAM:  General: Vital signs reviewed, in no apparent distress  Eyes: Anicteric  HENT: Normocephalic, atraumatic, trachea midline   Respiratory: Breathing nonlabored  Cardiovascular: Regular rate and rhythm  Musculoskeletal: No gross deformities  Neurologic: Grossly nonfocal exam  Psychiatric: Normal mood, affect and insight  Integumentary: Well-healed wound of gluteal cleft with 2 small open areas containing mitali of hair, hair removed from open areas and granulation tissue treated with silver nitrate, periwound superior gluteal cleft area shaved        ASSESSMENT:  Dimitris Colón is a 20 year old who presents for wound follow-up, status post excision of pilonidal cyst and tunnels        PLAN:  The patient's gluteal cleft wound actually looks to be healing well without evidence of infection or undrained fluid collection.  The 2 small open areas along the gluteal cleft incision site were debrided of care and treated with silver nitrate.  The area surrounding the patient surgical site was shaved today in clinic.  The patient was instructed to monitor his symptoms over the next 1 to 2 weeks.  If he has not noticed improvement, he was instructed to contact our clinic for repeat evaluation.      Again, thank you for allowing me to participate in the care of your patient.      Sincerely,      Henna Swift MD    "

## 2021-05-28 NOTE — TELEPHONE ENCOUNTER
Patient's dad calling back to see if  had suggestions for patient?  Patient is currently at urgent care to have wound area looked at.    They would like a call back at [phone]  OK to leave VM

## 2021-05-28 NOTE — LETTER
May 28, 2021          Elena Tejal Loki, APRN CNP  [address] [place] [address]      RE:   Dimitris Colón 2000      Dear Colleague,    Thank you for referring your patient, Dimitris Colnó, to [facility] at {[facility] - SITE LOCATIONS:[id number]}. Please see a copy of my visit note below.    [facility] Surgery [facility]     CHIEF COMPLAINT:  Chief Complaint   Patient presents with     RECHECK     Pilonidal cyst, draining and tunneling       HISTORY OF PRESENT ILLNESS:  Dimitris Colón is a 20 year old male who is seen for postoperative wound follow-up.  Patient underwent excision of pilonidal cyst and tunnels with marsupialization in January 2021 with Dr. Madrigal.  He did well postoperatively.  Patient reports that there were 2 small areas of his midline incision that never fully closed.  He has been working a lot lately doing construction.  With the physicality of his work, the patient reports he has noticed increasing pain and drainage at his gluteal cleft surgical site.  He describes drainage of blood and intermittently some purulent drainage.  He also has intermittent sharp pain.  Patient has not been having any fevers or chills.  No other systemic symptoms.  The patient was seen at the urgent care this morning and subsequently referred to our clinic for wound check.      No past medical history on file.    Past Surgical History:   Procedure Laterality Date     CYSTECTOMY PILONIDAL N/A 1/14/2021    Procedure: EXCISION PILONIDAL CYST;  Surgeon: Carrillo Madrigal MD;  Location:  OR     ENT SURGERY         Family History   Problem Relation Age of Onset     Breast Cancer Maternal Grandmother      Bone Cancer Maternal Grandmother      Prostate Cancer Maternal Grandfather      Diabetes Paternal Grandmother      Breast Cancer Paternal Grandmother        Social History     Tobacco Use     Smoking status: Never Smoker     Smokeless tobacco: Never Used   Substance Use Topics     Alcohol use: Never     " Frequency: Never       Patient Active Problem List   Diagnosis     Pilonidal cyst       No Known Allergies    No current outpatient medications on file.       Vitals: /62 (BP Location: Right arm, Patient Position: Sitting, Cuff Size: Adult Regular)   Pulse 56   Ht 1.803 m (5' 11\")   Wt 90.7 kg (200 lb)   SpO2 99%   BMI 27.89 kg/m    BMI= Body mass index is 27.89 kg/m .    EXAM:  General: Vital signs reviewed, in no apparent distress  Eyes: Anicteric  HENT: Normocephalic, atraumatic, trachea midline   Respiratory: Breathing nonlabored  Cardiovascular: Regular rate and rhythm  Musculoskeletal: No gross deformities  Neurologic: Grossly nonfocal exam  Psychiatric: Normal mood, affect and insight  Integumentary: Well-healed wound of gluteal cleft with 2 small open areas containing mitali of hair, hair removed from open areas and granulation tissue treated with silver nitrate, periwound superior gluteal cleft area shaved      ASSESSMENT:  Dimitris Colón is a 20 year old who presents for wound follow-up, status post excision of pilonidal cyst and tunnels      PLAN:  The patient's gluteal cleft wound actually looks to be healing well without evidence of infection or undrained fluid collection.  The 2 small open areas along the gluteal cleft incision site were debrided of care and treated with silver nitrate.  The area surrounding the patient surgical site was shaved today in clinic.  The patient was instructed to monitor his symptoms over the next 1 to 2 weeks.  If he has not noticed improvement, he was instructed to contact our clinic for repeat evaluation.    It was my pleasure to participate in the care of Dimitris Colón in clinic today.         Henna Swift MD          Again, thank you for allowing me to participate in the care of your patient.      Sincerely,      {[facility] PROVIDERS:[id number]}    ***/***      D: ***  T: ***  "

## 2021-05-28 NOTE — LETTER
May 28, 2021          Elena Tejal Loki, APRN CNP  [address] [place] [address]      RE:   Dimitris Colón 2000      Dear Colleague,    Thank you for referring your patient, Dimitris Colón, to [facility] at {[facility] - SITE LOCATIONS:[id number]}. Please see a copy of my visit note below.    [facility] Surgery [facility]     CHIEF COMPLAINT:  Chief Complaint   Patient presents with     RECHECK     Pilonidal cyst, draining and tunneling       HISTORY OF PRESENT ILLNESS:  Dimitris Colón is a 20 year old male who is seen for postoperative wound follow-up.  Patient underwent excision of pilonidal cyst and tunnels with marsupialization in January 2021 with Dr. Madrigal.  He did well postoperatively.  Patient reports that there were 2 small areas of his midline incision that never fully closed.  He has been working a lot lately doing construction.  With the physicality of his work, the patient reports he has noticed increasing pain and drainage at his gluteal cleft surgical site.  He describes drainage of blood and intermittently some purulent drainage.  He also has intermittent sharp pain.  Patient has not been having any fevers or chills.  No other systemic symptoms.  The patient was seen at the urgent care this morning and subsequently referred to our clinic for wound check.      No past medical history on file.    Past Surgical History:   Procedure Laterality Date     CYSTECTOMY PILONIDAL N/A 1/14/2021    Procedure: EXCISION PILONIDAL CYST;  Surgeon: Carrillo Madrigal MD;  Location:  OR     ENT SURGERY         Family History   Problem Relation Age of Onset     Breast Cancer Maternal Grandmother      Bone Cancer Maternal Grandmother      Prostate Cancer Maternal Grandfather      Diabetes Paternal Grandmother      Breast Cancer Paternal Grandmother        Social History     Tobacco Use     Smoking status: Never Smoker     Smokeless tobacco: Never Used   Substance Use Topics     Alcohol use: Never     " Frequency: Never       Patient Active Problem List   Diagnosis     Pilonidal cyst       No Known Allergies    No current outpatient medications on file.       Vitals: /62 (BP Location: Right arm, Patient Position: Sitting, Cuff Size: Adult Regular)   Pulse 56   Ht 1.803 m (5' 11\")   Wt 90.7 kg (200 lb)   SpO2 99%   BMI 27.89 kg/m    BMI= Body mass index is 27.89 kg/m .    EXAM:  General: Vital signs reviewed, in no apparent distress  Eyes: Anicteric  HENT: Normocephalic, atraumatic, trachea midline   Respiratory: Breathing nonlabored  Cardiovascular: Regular rate and rhythm  Musculoskeletal: No gross deformities  Neurologic: Grossly nonfocal exam  Psychiatric: Normal mood, affect and insight  Integumentary: Well-healed wound of gluteal cleft with 2 small open areas containing mitali of hair, hair removed from open areas and granulation tissue treated with silver nitrate, periwound superior gluteal cleft area shaved      ASSESSMENT:  Dimitris Colón is a 20 year old who presents for wound follow-up, status post excision of pilonidal cyst and tunnels      PLAN:  The patient's gluteal cleft wound actually looks to be healing well without evidence of infection or undrained fluid collection.  The 2 small open areas along the gluteal cleft incision site were debrided of care and treated with silver nitrate.  The area surrounding the patient surgical site was shaved today in clinic.  The patient was instructed to monitor his symptoms over the next 1 to 2 weeks.  If he has not noticed improvement, he was instructed to contact our clinic for repeat evaluation.    It was my pleasure to participate in the care of Dimitris Colón in clinic today.         Henna Swift MD          Again, thank you for allowing me to participate in the care of your patient.      Sincerely,      {[facility] PROVIDERS:[id number]}    ***/***      D: ***  T: ***  "

## 2021-06-02 ENCOUNTER — TELEPHONE (OUTPATIENT)
Dept: SURGERY | Facility: CLINIC | Age: 21
End: 2021-06-02

## 2021-06-02 ENCOUNTER — OFFICE VISIT (OUTPATIENT)
Dept: SURGERY | Facility: CLINIC | Age: 21
End: 2021-06-02
Payer: COMMERCIAL

## 2021-06-02 DIAGNOSIS — L05.91 PILONIDAL CYST: Primary | ICD-10-CM

## 2021-06-02 PROCEDURE — 99214 OFFICE O/P EST MOD 30 MIN: CPT | Performed by: SURGERY

## 2021-06-02 NOTE — TELEPHONE ENCOUNTER
"Operative Procedure:  Excision of pilonidal cyst and tunnels with marsupialization    Date:  01/14/2021    Surgeon:  Rohan    Patient and both parents calling, frustrated because symptoms are not improving.    At least two (maybe three) open areas that are draining.  Not improved at all since office visit last week.    Parents feel that \"aggressive action\" needs to be taken in order for this patient to heal and return to normal life.    Dr. Madrigal will see patient today at 12:30. Recommended to patient that one of his parents comes with for a second set of ears.    He is in agreement.    Bernie Gonzalez, RN-BSN    "

## 2021-06-03 NOTE — PROGRESS NOTES
General surgery clinic    Follow-up visit with Mr. Colón and his family as he continues to have some drainage and is open parts of his intergluteal incision.  There has been no purulent drainage, is just serosanguineous.  No fevers nor chills.  No fluid recollection.  He had this area examined, some hair removed, surrounding her clip by one of my partners last week.  At that time silver nitrate was utilized as well.   On exam today, despite him having taken a shower recently, there is significant amount of debris in the intergluteal region and getting into the open wounds.  All debris was cleaned.  There is no tunneling, no worrisome drainage.  Silver nitrate was applied.    I discussed with Mr. Colón and his family the importance of dedicated hygiene to this area to keeping debris at the minimum.  Areas of the incision outside of the intergluteal fold have healed completely.  He should return to see us in a week to reexamine the wound, assess progress, and use more silver nitrate as needed.  They understand and agree with this plan.    Total encounter time 30 minutes, more than half spent in counseling.

## 2021-06-09 ENCOUNTER — OFFICE VISIT (OUTPATIENT)
Dept: SURGERY | Facility: CLINIC | Age: 21
End: 2021-06-09
Payer: COMMERCIAL

## 2021-06-09 DIAGNOSIS — L05.91 PILONIDAL CYST: Primary | ICD-10-CM

## 2021-06-09 PROCEDURE — 99024 POSTOP FOLLOW-UP VISIT: CPT | Performed by: SURGERY

## 2021-06-11 NOTE — PROGRESS NOTES
General surgery follow-up    Short-term follow-up in Mr. Colón's pilonidal cyst, he has had less drainage, feels like it is getting smaller, no systemic symptoms.  On exam the area is  than last time.  Hair near the opening area starting to grow, but not getting into the wound.  There is no tunneling.  The wound is smaller, silver nitrate was reapplied.  Good progress.  Short-term follow-up in a week.

## 2021-06-16 ENCOUNTER — OFFICE VISIT (OUTPATIENT)
Dept: SURGERY | Facility: CLINIC | Age: 21
End: 2021-06-16
Payer: COMMERCIAL

## 2021-06-16 DIAGNOSIS — L05.91 PILONIDAL CYST: Primary | ICD-10-CM

## 2021-06-16 PROCEDURE — 99024 POSTOP FOLLOW-UP VISIT: CPT | Performed by: SURGERY

## 2021-06-29 NOTE — PROGRESS NOTES
General surgery clinic note, wound check    Follow-up visit Mr. Colón, he has had no new complaints and has noticed less drainage.  The intergluteal area is much , the wounds have for the most part healed.  There is only tender scar tissue where the openings where.  The area was cleansed and to help with maintaining the area free of debris some of the hair was clipped.  He will return to see us in 1 to 2 weeks for follow-up.    He is pleased with the results.

## 2021-06-30 ENCOUNTER — OFFICE VISIT (OUTPATIENT)
Dept: SURGERY | Facility: CLINIC | Age: 21
End: 2021-06-30
Payer: COMMERCIAL

## 2021-06-30 DIAGNOSIS — L05.91 PILONIDAL CYST: Primary | ICD-10-CM

## 2021-06-30 PROCEDURE — 99024 POSTOP FOLLOW-UP VISIT: CPT | Performed by: SURGERY

## 2021-07-04 NOTE — PROGRESS NOTES
General surgery clinic note    Since last visit Mr. Colón reports minimal to no drainage from his intergluteal region.    On exam 95% of the incision has healed, there is a small 1 mm skin opening that is shallow and he has no stigmata of infection.  The area was cleansed.  Instructions for care given, similar to what he has been doing currently.    He will return to see us in 1 to 2-week to assess the area.

## 2021-07-28 ENCOUNTER — OFFICE VISIT (OUTPATIENT)
Dept: SURGERY | Facility: CLINIC | Age: 21
End: 2021-07-28
Payer: COMMERCIAL

## 2021-07-28 DIAGNOSIS — L05.91 PILONIDAL CYST: Primary | ICD-10-CM

## 2021-07-28 PROCEDURE — 99024 POSTOP FOLLOW-UP VISIT: CPT | Performed by: SURGERY

## 2021-08-06 NOTE — PROGRESS NOTES
General surgery clinic note, wound check.    He had one small episode of drainage since last visit.  Since then no additional drainage.  On exam there is no open skin anymore.  Although the scar tissue looks delicate it is intact.  He will return to clinic as needed in 2 to 3 weeks.

## 2021-08-11 ENCOUNTER — OFFICE VISIT (OUTPATIENT)
Dept: SURGERY | Facility: CLINIC | Age: 21
End: 2021-08-11
Payer: COMMERCIAL

## 2021-08-11 DIAGNOSIS — L05.91 PILONIDAL CYST: Primary | ICD-10-CM

## 2021-08-11 PROCEDURE — 99024 POSTOP FOLLOW-UP VISIT: CPT | Performed by: SURGERY

## 2021-08-12 NOTE — PROGRESS NOTES
General surgery clinic note    Mr. Colón has continued to do well since last visit.  On exam the scar tissue in the midline of the intergluteal region has further matured without evidence of breakdown.  Healing well, talked about techniques to prevent recurrence.  He will return to see us as needed.

## 2023-04-24 NOTE — TELEPHONE ENCOUNTER
Name of caller: Beto moody    Reason for Call:  Symptoms-bleeding from same area where cyst was removed.    Surgeon:  Dr. Madrigal    Recent Surgery: -1/14/2021    EXCISION PILONIDAL CYST        Best phone number to reach Beto at is: [phone]    Consent to communicate on file for Beto moody.    Ok to leave a message with medical info? Yes.       Peng Advancement Flap Text: The defect edges were debeveled with a #15 scalpel blade.  Given the location of the defect, shape of the defect and the proximity to free margins a Peng advancement flap was deemed most appropriate.  Using a sterile surgical marker, an appropriate advancement flap was drawn incorporating the defect and placing the expected incisions within the relaxed skin tension lines where possible. The area thus outlined was incised deep to adipose tissue with a #15 scalpel blade.  The skin margins were undermined to an appropriate distance in all directions utilizing iris scissors.

## (undated) DEVICE — ESU PENCIL W/SMOKE EVAC CVPLP2000

## (undated) DEVICE — PANTIES MESH LG/XLG 2PK 706M2

## (undated) DEVICE — SYR EAR BULB 3OZ 0035830

## (undated) DEVICE — SPONGE BALL KERLIX ROUND XL W/O STRING LATEX 4935

## (undated) DEVICE — SU MONOCRYL 4-0 PS-2 18" UND Y496G

## (undated) DEVICE — GLOVE PROTEXIS W/NEU-THERA 7.5  2D73TE75

## (undated) DEVICE — SOL NACL 0.9% IRRIG 1000ML BOTTLE 2F7124

## (undated) DEVICE — SUCTION CANISTER MEDIVAC LINER 3000ML W/LID 65651-530

## (undated) DEVICE — PREP CHLORAPREP 26ML TINTED ORANGE  260815

## (undated) DEVICE — ESU GROUND PAD UNIVERSAL W/O CORD

## (undated) DEVICE — DRAPE MINOR PROCEDURE LAP 29496

## (undated) DEVICE — DRSG KERLIX FLUFFS X5

## (undated) DEVICE — PACK MINOR SBA15MIFSE

## (undated) DEVICE — SU VICRYL 3-0 SH 27" J316H

## (undated) DEVICE — NDL SPINAL 22GA 3.5" QUINCKE 405181

## (undated) DEVICE — SOL WATER IRRIG 1000ML BOTTLE 2F7114

## (undated) DEVICE — GLOVE PROTEXIS MICRO 7.5  2D73PM75

## (undated) DEVICE — NDL 22GA 1.5"

## (undated) DEVICE — ESU ELEC BLADE 2.75" COATED/INSULATED E1455

## (undated) DEVICE — SU ETHILON 3-0 FS-1 18" 669H

## (undated) DEVICE — SUCTION TIP YANKAUER STR K87

## (undated) DEVICE — LINEN TOWEL PACK X5 5464

## (undated) DEVICE — GLOVE PROTEXIS BLUE W/NEU-THERA 7.5  2D73EB75

## (undated) RX ORDER — FENTANYL CITRATE 50 UG/ML
INJECTION, SOLUTION INTRAMUSCULAR; INTRAVENOUS
Status: DISPENSED
Start: 2021-01-14

## (undated) RX ORDER — PROPOFOL 10 MG/ML
INJECTION, EMULSION INTRAVENOUS
Status: DISPENSED
Start: 2021-01-14

## (undated) RX ORDER — CEFAZOLIN SODIUM 2 G/100ML
INJECTION, SOLUTION INTRAVENOUS
Status: DISPENSED
Start: 2021-01-14

## (undated) RX ORDER — NEOSTIGMINE METHYLSULFATE 1 MG/ML
VIAL (ML) INJECTION
Status: DISPENSED
Start: 2021-01-14

## (undated) RX ORDER — GLYCOPYRROLATE 0.2 MG/ML
INJECTION, SOLUTION INTRAMUSCULAR; INTRAVENOUS
Status: DISPENSED
Start: 2021-01-14